# Patient Record
Sex: MALE | Race: WHITE | NOT HISPANIC OR LATINO | URBAN - METROPOLITAN AREA
[De-identification: names, ages, dates, MRNs, and addresses within clinical notes are randomized per-mention and may not be internally consistent; named-entity substitution may affect disease eponyms.]

---

## 2019-01-25 ENCOUNTER — FOLLOW UP ESTABLISHED (OUTPATIENT)
Dept: URBAN - METROPOLITAN AREA CLINIC 10 | Facility: CLINIC | Age: 54
End: 2019-01-25
Payer: COMMERCIAL

## 2019-01-25 DIAGNOSIS — H25.13 AGE-RELATED NUCLEAR CATARACT, BILATERAL: ICD-10-CM

## 2019-01-25 PROCEDURE — 92015 DETERMINE REFRACTIVE STATE: CPT | Performed by: OPTOMETRIST

## 2019-01-25 PROCEDURE — 92014 COMPRE OPH EXAM EST PT 1/>: CPT | Performed by: OPTOMETRIST

## 2019-01-25 ASSESSMENT — INTRAOCULAR PRESSURE
OD: 13
OS: 15

## 2019-01-25 ASSESSMENT — VISUAL ACUITY
OS: 20/20
OD: 20/20

## 2019-01-25 ASSESSMENT — KERATOMETRY: OS: 44.00

## 2021-03-15 ENCOUNTER — FOLLOW UP ESTABLISHED (OUTPATIENT)
Dept: URBAN - METROPOLITAN AREA CLINIC 10 | Facility: CLINIC | Age: 56
End: 2021-03-15
Payer: MEDICAID

## 2021-03-15 DIAGNOSIS — S05.02XA CORNEAL ABRASION OF LEFT EYE, INITIAL ENCOUNTER: Primary | ICD-10-CM

## 2021-03-15 PROCEDURE — 92012 INTRM OPH EXAM EST PATIENT: CPT | Performed by: OPTOMETRIST

## 2021-03-15 ASSESSMENT — INTRAOCULAR PRESSURE
OD: 18
OS: 13

## 2021-11-15 ENCOUNTER — OFFICE VISIT (OUTPATIENT)
Dept: URBAN - METROPOLITAN AREA CLINIC 10 | Facility: CLINIC | Age: 56
End: 2021-11-15
Payer: MEDICAID

## 2021-11-15 DIAGNOSIS — H53.2 DIPLOPIA: ICD-10-CM

## 2021-11-15 DIAGNOSIS — H40.013 OPEN ANGLE WITH BORDERLINE FINDINGS, LOW RISK, BILATERAL: ICD-10-CM

## 2021-11-15 DIAGNOSIS — H52.223 REGULAR ASTIGMATISM, BILATERAL: Primary | ICD-10-CM

## 2021-11-15 PROCEDURE — 92014 COMPRE OPH EXAM EST PT 1/>: CPT | Performed by: OPTOMETRIST

## 2021-11-15 PROCEDURE — 92060 SENSORIMOTOR EXAMINATION: CPT | Performed by: OPTOMETRIST

## 2021-11-15 ASSESSMENT — INTRAOCULAR PRESSURE
OD: 16
OS: 16

## 2021-11-15 ASSESSMENT — VISUAL ACUITY
OD: 20/20
OS: 20/20

## 2021-11-15 NOTE — IMPRESSION/PLAN
Impression: Open angle with borderline findings, low risk, bilateral
--nerves mildly suspicious --denies fhx Plan: IOP is normal and nerves appear stable OU. NFL OCT normal OU. Risk likely remains low so will updated OCT next visit. RTC 1 year Complete c NFL OCT.

## 2021-11-15 NOTE — IMPRESSION/PLAN
Impression: Age-related nuclear cataract, bilateral Plan: Very mild. New spec rx today c same prism. Pt. denies diplopia but improved comfort c longstanding vertical prism.

## 2021-11-15 NOTE — IMPRESSION/PLAN
Impression: Diplopia: H53.2. Plan: Longstanding c prism in Rx. Increased today. 
Mrx updated per pt request

## 2023-02-03 ENCOUNTER — OFFICE VISIT (OUTPATIENT)
Dept: URBAN - METROPOLITAN AREA CLINIC 10 | Facility: CLINIC | Age: 58
End: 2023-02-03
Payer: MEDICAID

## 2023-02-03 DIAGNOSIS — H40.013 OPEN ANGLE WITH BORDERLINE FINDINGS, LOW RISK, BILATERAL: ICD-10-CM

## 2023-02-03 DIAGNOSIS — H53.2 DIPLOPIA: ICD-10-CM

## 2023-02-03 DIAGNOSIS — H25.813 COMBINED FORMS OF AGE-RELATED CATARACT, BILATERAL: Primary | ICD-10-CM

## 2023-02-03 PROCEDURE — 92014 COMPRE OPH EXAM EST PT 1/>: CPT | Performed by: OPTOMETRIST

## 2023-02-03 PROCEDURE — 92133 CPTRZD OPH DX IMG PST SGM ON: CPT | Performed by: OPTOMETRIST

## 2023-02-03 ASSESSMENT — KERATOMETRY
OD: 43.63
OS: 43.25

## 2023-02-03 ASSESSMENT — VISUAL ACUITY
OS: 20/20
OD: 20/20

## 2023-02-03 NOTE — IMPRESSION/PLAN
Impression: Open angle with borderline findings, low risk, bilateral
--nerves mildly suspicious --denies fhx Plan: IOP is normal and nerves appear stable OU. NFL OCT normal OU. Risk likely remains low so will updated OCT next visit. RTC 1 year Complete Exam order NFL OCT.

## 2023-02-03 NOTE — IMPRESSION/PLAN
Impression: Diplopia: H53.2. Plan: Longstanding c prism in Rx. Stable today. 
Mrx updated per pt request